# Patient Record
Sex: FEMALE | Race: WHITE
[De-identification: names, ages, dates, MRNs, and addresses within clinical notes are randomized per-mention and may not be internally consistent; named-entity substitution may affect disease eponyms.]

---

## 2019-11-01 ENCOUNTER — HOSPITAL ENCOUNTER (OUTPATIENT)
Dept: HOSPITAL 84 - D.US | Age: 64
End: 2019-11-01
Attending: INTERNAL MEDICINE
Payer: COMMERCIAL

## 2019-11-01 VITALS — BODY MASS INDEX: 30.3 KG/M2

## 2019-11-01 DIAGNOSIS — R10.13: Primary | ICD-10-CM

## 2019-11-01 DIAGNOSIS — R11.0: ICD-10-CM

## 2019-12-05 ENCOUNTER — HOSPITAL ENCOUNTER (OUTPATIENT)
Dept: HOSPITAL 84 - D.OPS | Age: 64
Discharge: HOME | End: 2019-12-05
Attending: SURGERY
Payer: COMMERCIAL

## 2019-12-05 VITALS — BODY MASS INDEX: 30.3 KG/M2

## 2019-12-05 DIAGNOSIS — K21.0: Primary | ICD-10-CM

## 2019-12-30 ENCOUNTER — HOSPITAL ENCOUNTER (INPATIENT)
Dept: HOSPITAL 84 - D.OPS | Age: 64
LOS: 1 days | Discharge: HOME | DRG: 328 | End: 2019-12-31
Attending: SURGERY | Admitting: SURGERY
Payer: COMMERCIAL

## 2019-12-30 VITALS
BODY MASS INDEX: 30.3 KG/M2 | BODY MASS INDEX: 30.3 KG/M2 | HEIGHT: 59 IN | WEIGHT: 150.32 LBS | WEIGHT: 150.32 LBS | HEIGHT: 59 IN | BODY MASS INDEX: 30.3 KG/M2

## 2019-12-30 VITALS — SYSTOLIC BLOOD PRESSURE: 118 MMHG | DIASTOLIC BLOOD PRESSURE: 52 MMHG

## 2019-12-30 VITALS — SYSTOLIC BLOOD PRESSURE: 112 MMHG | DIASTOLIC BLOOD PRESSURE: 56 MMHG

## 2019-12-30 VITALS — DIASTOLIC BLOOD PRESSURE: 64 MMHG | SYSTOLIC BLOOD PRESSURE: 124 MMHG

## 2019-12-30 VITALS — SYSTOLIC BLOOD PRESSURE: 127 MMHG | DIASTOLIC BLOOD PRESSURE: 67 MMHG

## 2019-12-30 VITALS — DIASTOLIC BLOOD PRESSURE: 52 MMHG | SYSTOLIC BLOOD PRESSURE: 118 MMHG

## 2019-12-30 VITALS — DIASTOLIC BLOOD PRESSURE: 67 MMHG | SYSTOLIC BLOOD PRESSURE: 112 MMHG

## 2019-12-30 DIAGNOSIS — M19.90: ICD-10-CM

## 2019-12-30 DIAGNOSIS — J44.9: ICD-10-CM

## 2019-12-30 DIAGNOSIS — E66.3: ICD-10-CM

## 2019-12-30 DIAGNOSIS — E11.9: ICD-10-CM

## 2019-12-30 DIAGNOSIS — K21.9: ICD-10-CM

## 2019-12-30 DIAGNOSIS — K22.70: ICD-10-CM

## 2019-12-30 DIAGNOSIS — I10: ICD-10-CM

## 2019-12-30 DIAGNOSIS — K44.9: Primary | ICD-10-CM

## 2019-12-30 LAB
ANION GAP SERPL CALC-SCNC: 12.8 MMOL/L (ref 8–16)
BUN SERPL-MCNC: 18 MG/DL (ref 7–18)
CALCIUM SERPL-MCNC: 9.7 MG/DL (ref 8.5–10.1)
CHLORIDE SERPL-SCNC: 107 MMOL/L (ref 98–107)
CO2 SERPL-SCNC: 29.8 MMOL/L (ref 21–32)
CREAT SERPL-MCNC: 1 MG/DL (ref 0.6–1.3)
ERYTHROCYTE [DISTWIDTH] IN BLOOD BY AUTOMATED COUNT: 13.3 % (ref 11.5–14.5)
GLUCOSE SERPL-MCNC: 125 MG/DL (ref 74–106)
HCT VFR BLD CALC: 46.5 % (ref 36–48)
HGB BLD-MCNC: 15.2 G/DL (ref 12–16)
MCH RBC QN AUTO: 30.9 PG (ref 26–34)
MCHC RBC AUTO-ENTMCNC: 32.7 G/DL (ref 31–37)
MCV RBC: 94.5 FL (ref 80–100)
OSMOLALITY SERPL CALC.SUM OF ELEC: 291 MOSM/KG (ref 275–300)
PLATELET # BLD: 226 10X3/UL (ref 130–400)
PMV BLD AUTO: 10 FL (ref 7.4–10.4)
POTASSIUM SERPL-SCNC: 4.6 MMOL/L (ref 3.5–5.1)
RBC # BLD AUTO: 4.92 10X6/UL (ref 4–5.4)
SODIUM SERPL-SCNC: 145 MMOL/L (ref 136–145)
WBC # BLD AUTO: 8.8 10X3/UL (ref 4.8–10.8)

## 2019-12-30 PROCEDURE — 0BQT4ZZ REPAIR DIAPHRAGM, PERCUTANEOUS ENDOSCOPIC APPROACH: ICD-10-PCS | Performed by: SURGERY

## 2019-12-30 PROCEDURE — 0DV44ZZ RESTRICTION OF ESOPHAGOGASTRIC JUNCTION, PERCUTANEOUS ENDOSCOPIC APPROACH: ICD-10-PCS | Performed by: SURGERY

## 2019-12-30 NOTE — NUR
IN BED WITH EYES CLOSED, APPEARS TO BE RESTING QUIELTY. ARROUSES TO VOICE.
ABLE TO VOICE ALL NEEDS. IV TO LEFT HAND IS PATENT WITH LR INFUSING VIA
ORDERS. O2 AT 2LPM VIA NC. NO S/S OF ANY ACUTE DISTRESS. DENIES ANY PAIN AT
THIS TIME. WILL NOTE ANY CHANGE.

## 2019-12-30 NOTE — NUR
PATIENT ADMITTED TO ROOM 2233. ADMISSION COMPLETE. LAP SITES X 2 C/D/I. LAP
SITES X 2 OOZING. 4X4 GAUZE APPLIED OVER TOP OF LAP SITES. O2 PLACED ON
PATIENT AT 2L NC D/T DESAT TO 89% ON ROOM AIR. BROTHER IN ROOM WITH PATIENT.
POSEY ALARM ON. WILL CONTINUE TO MONITOR.

## 2019-12-31 VITALS — SYSTOLIC BLOOD PRESSURE: 114 MMHG | DIASTOLIC BLOOD PRESSURE: 60 MMHG

## 2019-12-31 VITALS — SYSTOLIC BLOOD PRESSURE: 126 MMHG | DIASTOLIC BLOOD PRESSURE: 64 MMHG

## 2019-12-31 VITALS — DIASTOLIC BLOOD PRESSURE: 53 MMHG | SYSTOLIC BLOOD PRESSURE: 107 MMHG

## 2019-12-31 VITALS — DIASTOLIC BLOOD PRESSURE: 58 MMHG | SYSTOLIC BLOOD PRESSURE: 117 MMHG

## 2019-12-31 LAB
ALBUMIN SERPL-MCNC: 2.9 G/DL (ref 3.4–5)
ALP SERPL-CCNC: 77 U/L (ref 46–116)
ALT SERPL-CCNC: 42 U/L (ref 10–68)
ANION GAP SERPL CALC-SCNC: 10.5 MMOL/L (ref 8–16)
BASOPHILS NFR BLD AUTO: 0.2 % (ref 0–2)
BILIRUB SERPL-MCNC: 0.42 MG/DL (ref 0.2–1.3)
BUN SERPL-MCNC: 14 MG/DL (ref 7–18)
CALCIUM SERPL-MCNC: 8.5 MG/DL (ref 8.5–10.1)
CHLORIDE SERPL-SCNC: 107 MMOL/L (ref 98–107)
CO2 SERPL-SCNC: 27.7 MMOL/L (ref 21–32)
CREAT SERPL-MCNC: 0.9 MG/DL (ref 0.6–1.3)
EOSINOPHIL NFR BLD: 0.4 % (ref 0–7)
ERYTHROCYTE [DISTWIDTH] IN BLOOD BY AUTOMATED COUNT: 13.5 % (ref 11.5–14.5)
GLOBULIN SER-MCNC: 3 G/L
GLUCOSE SERPL-MCNC: 141 MG/DL (ref 74–106)
HCT VFR BLD CALC: 40.6 % (ref 36–48)
HGB BLD-MCNC: 13.1 G/DL (ref 12–16)
IMM GRANULOCYTES NFR BLD: 0.2 % (ref 0–5)
LYMPHOCYTES NFR BLD AUTO: 19.7 % (ref 15–50)
MCH RBC QN AUTO: 30.3 PG (ref 26–34)
MCHC RBC AUTO-ENTMCNC: 32.3 G/DL (ref 31–37)
MCV RBC: 93.8 FL (ref 80–100)
MONOCYTES NFR BLD: 6.5 % (ref 2–11)
NEUTROPHILS NFR BLD AUTO: 73 % (ref 40–80)
OSMOLALITY SERPL CALC.SUM OF ELEC: 283 MOSM/KG (ref 275–300)
PLATELET # BLD: 213 10X3/UL (ref 130–400)
PMV BLD AUTO: 10.3 FL (ref 7.4–10.4)
POTASSIUM SERPL-SCNC: 4.2 MMOL/L (ref 3.5–5.1)
PROT SERPL-MCNC: 5.9 G/DL (ref 6.4–8.2)
RBC # BLD AUTO: 4.33 10X6/UL (ref 4–5.4)
SODIUM SERPL-SCNC: 141 MMOL/L (ref 136–145)
WBC # BLD AUTO: 10.4 10X3/UL (ref 4.8–10.8)

## 2019-12-31 NOTE — NUR
ALL DISCHARGE INSTRUCTIONS COVERED WITH PT AND PT FAMILY MEMBER. PT DENIES
FURTHER QUESTIONS/CONCERNS/NEEDS AT THIS TIME. PIV REMOVED FROM LEFT HAND WITH
CATHETER TIP INTACT. DRESSING APPLIED. ALL DISCHARGE PAPERS SIGNED.

## 2019-12-31 NOTE — NUR
DR LUND RETURNED PAGE AND INFORMED OF PT STATUS. OK TO DISCHARGE HOME
FOLLOWING INSTRUCTIONS IN DISCHARGE SUMMARY.

## 2019-12-31 NOTE — NUR
PT STATES THAT HER PAIN IS MANAGABLE AND STATES THQAT SHE BELIEVES SHE IS WELL
ENOUGH AND WOULD LIKE TO DC HOME. PAGE PLACED TO DR LUND TO NOTIFY AS
REQUESTED.

## 2020-01-03 NOTE — MORECARE
CASE MANAGEMENT DISCHARGE SUMMARY
 
 
PATIENT: ANTONIETTA VALENZUELA               UNIT: [id number]
ACCOUNT#: [id number]                       ADM DATE: 19
AGE: 64     : 53  SEX: F            [facility]    
AUTHOR: ANA SEGAL                             PHYSICIAN:                               
 
REFERRING PHYSICIAN: DESTINEE LUND MD             
DATE OF SERVICE: 20
Discharge Plan
 
 
Patient Name: ANTONIETTA VALENZUELA
Facility: [facility]:[place]
Encounter #: [id number]
Medical Record #: [id number]
: 1955
Planned Disposition: 
Anticipated Discharge Date: 
 
Discharge Date: 2019
Expected LOS: 
Initial Reviewer: [email or web address]
Initial Review Date: 2020
Generated: 1/3/20   4:07 pm 
  
 
 
 
 
 
 
 
Patient Name: ANTONIETTA VALENZUELA
 
Encounter #: [id number]
Page [phone]
 
 
 
 
 
Electronically Signed by ANA SEGAL on 20 at 1508
 
 
 
 
 
 
**All edits/amendments must be made on the electronic document**
 
DICTATION DATE: 20 1507     : NANCY  20 1507     
RPT#: [id number]                                DC DATE:19
                                               STATUS: DIS IN  
[facility]
[address] [place] [address]
***END OF REPORT***

## 2020-01-03 NOTE — MORECARE
CASE MANAGEMENT DISCHARGE SUMMARY
 
 
PATIENT: ANTONIETTA VALENZUELA               UNIT: [id number]
ACCOUNT#: [id number]                       ADM DATE: 19
AGE: 64     : 53  SEX: F            ROOM/BED: D.[facility]    
AUTHOR: ANA SEGAL                             PHYSICIAN:                               
 
REFERRING PHYSICIAN: DESTINEE LUND MD             
DATE OF SERVICE: 20
Discharge Plan
 
 
Patient Name: ANTONIETTA VALENZUELA
Facility: [facility]:[place]
Encounter #: [id number]
Medical Record #: [id number]
: 1955
Planned Disposition: 
Anticipated Discharge Date: 
 
Discharge Date: 2019
Expected LOS: 0
Initial Reviewer: [email or web address]
Initial Review Date: 2020
Generated: 1/3/20   4:19 pm 
  
 
 
 
 
 
 
 
 
Last DP export: 1/3/20   2:07 pm
Patient Name: ANTONIETTA VALENZUELA
 
Encounter #: [id number]
Page [phone]
 
 
 
 
 
Electronically Signed by ANA SEGAL on 20 at 1519
 
 
 
 
 
 
**All edits/amendments must be made on the electronic document**
 
DICTATION DATE: 20 1519     : NANCY  20 1519     
RPT#: [id number]                                DC DATE:19
                                               STATUS: DIS IN  
[facility]
[address] [place] [address]
***END OF REPORT***